# Patient Record
Sex: FEMALE | Race: WHITE
[De-identification: names, ages, dates, MRNs, and addresses within clinical notes are randomized per-mention and may not be internally consistent; named-entity substitution may affect disease eponyms.]

---

## 2017-01-20 ENCOUNTER — HOSPITAL ENCOUNTER (EMERGENCY)
Dept: HOSPITAL 62 - ER | Age: 27
Discharge: HOME | End: 2017-01-20
Payer: MEDICAID

## 2017-01-20 VITALS — SYSTOLIC BLOOD PRESSURE: 103 MMHG | DIASTOLIC BLOOD PRESSURE: 68 MMHG

## 2017-01-20 DIAGNOSIS — G43.119: Primary | ICD-10-CM

## 2017-01-20 DIAGNOSIS — Z87.892: ICD-10-CM

## 2017-01-20 DIAGNOSIS — Z88.6: ICD-10-CM

## 2017-01-20 DIAGNOSIS — F17.200: ICD-10-CM

## 2017-01-20 PROCEDURE — 96372 THER/PROPH/DIAG INJ SC/IM: CPT

## 2017-01-20 PROCEDURE — 99283 EMERGENCY DEPT VISIT LOW MDM: CPT

## 2017-01-20 PROCEDURE — 96374 THER/PROPH/DIAG INJ IV PUSH: CPT

## 2017-01-20 PROCEDURE — 96361 HYDRATE IV INFUSION ADD-ON: CPT

## 2017-01-20 NOTE — ER DOCUMENT REPORT
ED Headache





- General


Chief Complaint: Headache <24 hrs old


Stated Complaint: VOMITING


Mode of Arrival: Ambulatory


Notes: 


Patient is a 18-year-old female comes emergency Department complaining of a 

migraine that started this morning.  Patient states that she has had light 

sensitivity, sound sensitivity and tried to sleep it off without any 

improvement.  She's been vomited twice today without any evidence of blood or 

hematochezia.  He states she is allergic to Imitrex





PCP is [facility]


Has a neurologist, where the name





Past medical history significant for migraine headaches, ectopic pregnancy


Past surgical history significant for cholecystectomy, tonsils and adenoids, 

salpingoectomy R


Social history previous opiate addict, social marijuana use and 5 pack years


TRAVEL OUTSIDE OF THE [place] IN LAST 30 DAYS: No





- Related Data


Allergies/Adverse Reactions: 


 





sumatriptan [Sumatriptan] Allergy (Severe, Verified 01/20/17 19:22)


 Anaphylaxis











Past Medical History





- General


Information source: Patient





- Social History


Smoking Status: Current Every Day Smoker


Chew tobacco use (# tins/day): No


Frequency of alcohol use: None


Drug Abuse: None


Family History: Reviewed & Not Pertinent


Patient has suicidal ideation: No


Patient has homicidal ideation: No


Neurological Medical History: Reports: Hx Migraine


Renal/ Medical History: Denies: Hx Peritoneal Dialysis


Past Surgical History: Reports: Hx Tonsillectomy





- Immunizations


Immunizations up to date: Yes


Hx Diphtheria, Pertussis, Tetanus Vaccination: Yes





Review of Systems





- Review of Systems


Constitutional: No symptoms reported


EENT: No symptoms reported


Cardiovascular: No symptoms reported


Respiratory: No symptoms reported


Gastrointestinal: No symptoms reported


Genitourinary: No symptoms reported


Female Genitourinary: No symptoms reported


Musculoskeletal: No symptoms reported


Skin: No symptoms reported


Hematologic/Lymphatic: No symptoms reported


Neurological/Psychological: See HPI


-: Yes All other systems reviewed and negative





Physical Exam





- Vital signs


Vitals: 


 











Temp Pulse Resp BP Pulse Ox


 


 97.7 F   51 L  16   128/74 H  100 


 


 01/20/17 19:17  01/20/17 19:17  01/20/17 19:17  01/20/17 19:17  01/20/17 19:17














- General


General appearance: Appears well, Alert


In distress: Mild





- HEENT


Head: Normocephalic


Eyes: Normal


Conjunctiva: Normal


Pupils: PERRL





- Neurological


Neuro grossly intact: Yes


Cognition: Normal


Orientation: AAOx4


[place] Coma Scale Eye Opening: Spontaneous


[place] Coma Scale Verbal: Oriented


[place] Coma Scale Motor: Obeys Commands


[place] Coma Scale Total: 15


Speech: Normal


Cranial nerves: Normal


Cerebellar coordination: Normal


Motor strength normal: LUE, RUE, LLE, RLE





- Psychological


Associated symptoms: Normal affect, Normal mood





- Skin


Skin Temperature: Warm


Skin Moisture: Dry





Course





- Re-evaluation


Re-evalutation: 





01/20/17 23:19


Patient is a 18-year-old female who has a migraine headache.  She was medicated 

with Compazine, Reglan and given a liter of IV fluids.  Upon reassessment 

states she feels much improved and she is ready to go home.  Patient encouraged 

to follow up with her neurologist to discuss outpatient management of her 

migraine headaches.





- Vital Signs


Vital signs: 


 











Temp Pulse Resp BP Pulse Ox


 


 97.7 F   51 L  16   128/74 H  100 


 


 01/20/17 19:17  01/20/17 19:17  01/20/17 19:17  01/20/17 19:17  01/20/17 19:17














Discharge





- Discharge


Clinical Impression: 


Migraine


Qualifiers:


 Migraine type: with aura Status migrainosus presence: without status 

migrainosus Intractability: intractable Qualified Code(s): G43.119 - Migraine 

with aura, intractable, without status migrainosus





Condition: Good


Disposition: HOME, SELF-CARE


Additional Instructions: 


HEADACHE:





     The physician does not feel that the headache you are experiencing has a 

serious underlying cause.  Most headaches are due to emotional stress, with 

resultant muscle tension (tension headache). Occasionally, headaches are 

secondary to changes in the blood vessels of the scalp (vascular headache and 

migraine headache).  Sometimes, a headache is the first symptom of another 

developing illness, such as a viral infection.  You have no evidence of stroke, 

bleeding, meningitis, or other serious cause of your headache.


     The treatment of headaches varies with the severity and cause of the pain.

  Not all headaches need pain shots.  In fact, there is evidence that using 

narcotics for headaches may make them worse in the long run.  The physician 

will determine the therapy that's in your best interest.


     If you develop a fever, if the headache is different from any you've 

previously experienced, or if the headache progressively worsens, then call 

your physician at once or go to the emergency room.








REGLAN (METOCLOPRAMIDE):


     Reglan has been prescribed.  This medicine affects the stomach and 

intestines.  It can be used to treat nausea and vomiting, to prevent reflux of 

stomach acid up into the esophagus, or to increase the contractions of the 

stomach and intestines.  It is often prescribed for esophagitis, and for 

paralysis of the stomach in diabetics.


     Reglan can cause either mild restlessness or drowsiness.  You should 

contact the doctor at once if you become extremely restless, anxious, or cannot 

sleep, or if you develop uncontrollable motions of the lips, tongue, or jaw.


     Do not take alcohol with this medicine.  Do not drive or operate machinery 

until you have been taking this medicine long enough to know how it affects you.


     Call the doctor if you develop abdominal pains, lightheadedness, black 

stool, or blood in the stool or vomitus.








USE OF DIPHENHYDRAMINE:


     Diphenhydramine (Benadryl) is an antihistamine and has been recommended to 

help treat your headache and to prevent side effects of other medications used 

to treat headaches.  The medication can be repeated four times daily.


     Age         Elixir (12.5 mg/tsp)         25 mg pill


     adult                                     1-2 tabs


     Antihistamines may cause drowsiness, especially with the first dose.  Do 

not operate machinery or drive while under the effects of the medication.  Do 

not combine the medication with alcohol, or with any other medication without 

talking to your doctor.








INTRAVENOUS COMPAZINE FOR HEADACHE:


     You have received therapy for headaches, using intravenous Compazine.  

This treatment is dramatically successful in relieving the headache in about 50 

percent of cases.  When it works, it provides a rapid method of eliminating the 

headache without resorting to narcotics (and the problems associated with them).


     Most patients still feel fully alert after the Compazine, but others may 

be slightly drowsy.  It's best not to drive or work with machinery for six to 

eight hours.  Do not take alcohol or other medication unless you discuss it 

with the doctor.


     If you develop tightness and spasms in your muscles, especially the neck 

and tongue, you should return.  This is a side effect which can be treated.











FOLLOW-UP CARE:


If you have been referred to a physician for follow-up care, call the physician

s office for an appointment as you were instructed or within the next two days.

  If you experience worsening or a significant change in your symptoms, notify 

the physician immediately or return to the Emergency Department at any time for 

re-evaluation.





-Please be sure to follow-up with your neurologist this coming week.


Referrals: 


BRINDA MEZA DO [Primary Care Provider] - Follow up as needed

## 2017-02-22 ENCOUNTER — HOSPITAL ENCOUNTER (EMERGENCY)
Dept: HOSPITAL 62 - ER | Age: 27
Discharge: HOME | End: 2017-02-22
Payer: MEDICAID

## 2017-02-22 VITALS — DIASTOLIC BLOOD PRESSURE: 80 MMHG | SYSTOLIC BLOOD PRESSURE: 100 MMHG

## 2017-02-22 DIAGNOSIS — S60.212A: ICD-10-CM

## 2017-02-22 DIAGNOSIS — M25.522: ICD-10-CM
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
## 2017-02-22 DIAGNOSIS — F17.210: ICD-10-CM

## 2017-02-22 DIAGNOSIS — M25.532: ICD-10-CM

## 2017-02-22 DIAGNOSIS — S59.802A: Primary | ICD-10-CM

## 2017-02-22 PROCEDURE — 99283 EMERGENCY DEPT VISIT LOW MDM: CPT
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
## 2017-03-04 ENCOUNTER — HOSPITAL ENCOUNTER (EMERGENCY)
Dept: HOSPITAL 62 - ER | Age: 27
Discharge: HOME | End: 2017-03-04
Payer: MEDICAID

## 2017-03-04 DIAGNOSIS — R09.81: ICD-10-CM

## 2017-03-04 DIAGNOSIS — Z88.6: ICD-10-CM

## 2017-03-04 DIAGNOSIS — J34.89: ICD-10-CM

## 2017-03-04 DIAGNOSIS — J01.00: ICD-10-CM

## 2017-03-04 DIAGNOSIS — R50.9: ICD-10-CM

## 2017-03-04 DIAGNOSIS — J40: Primary | ICD-10-CM

## 2017-03-04 DIAGNOSIS — F17.210: ICD-10-CM

## 2017-03-04 DIAGNOSIS — R07.89: ICD-10-CM

## 2017-03-04 DIAGNOSIS — Z87.892: ICD-10-CM

## 2017-03-04 DIAGNOSIS — R05: ICD-10-CM

## 2017-03-04 PROCEDURE — 99283 EMERGENCY DEPT VISIT LOW MDM: CPT

## 2017-03-04 NOTE — ER DOCUMENT REPORT
ED Respiratory Problem





- General


Chief Complaint: Cough


Stated Complaint: FEVER/VOMITING


Time seen by provider: 23:42


Mode of Arrival: Ambulatory


Information source: Patient


TRAVEL OUTSIDE OF THE [place] IN LAST 30 DAYS: No





- HPI


Patient complains to provider of: Cough


Onset: Other - 3-months


Duration: Worse/persistent


Quality of pain: No pain


Context: Smoker


Short of Breath: Mild


Chest pain/discomfort: Tightness


Cough: Productive


Sputum amount: Small


Sputum color: Creamy


Sputum consistency: Mucoid


Associated symptoms: Congestion, Cough, Fever, Sinus pain/pressure


Similar symptoms previously: Yes


Recently seen / treated by doctor: No


Notes: 


Patient is a 26-year-old female who presents to the emergency room complaining 

of productive cough, nasal congestion, sinus pain and pressure, fever, symptoms 

have been going on for the past 3 months, she states she saw primary care 

provider a few weeks ago and was placed on antibiotics, this did not help her 

symptoms at all, patient is a smoker, in fact on my initial evaluation is 

unable to locate her in the room initially, when she returned she admitted that 

she had just been outside smoking a cigarette





- Related Data


Allergies/Adverse Reactions: 


 





sumatriptan [Sumatriptan] Allergy (Severe, Verified 03/04/17 22:09)


 Anaphylaxis











Past Medical History





- General


Information source: Patient





- Social History


Smoking Status: Never Smoker


Family History: Reviewed & Not Pertinent


Patient has suicidal ideation: No


Patient has homicidal ideation: No


Neurological Medical History: Reports: Hx Migraine


Renal/ Medical History: Denies: Hx Peritoneal Dialysis


Past Surgical History: Reports: Hx Tonsillectomy





- Immunizations


Immunizations up to date: Yes


Hx Diphtheria, Pertussis, Tetanus Vaccination: Yes





Review of Systems





- Review of Systems


Constitutional: Fever


EENT: See HPI


Cardiovascular: No symptoms reported


Respiratory: Cough


Gastrointestinal: No symptoms reported


Genitourinary: No symptoms reported


Female Genitourinary: No symptoms reported


Musculoskeletal: No symptoms reported


Skin: No symptoms reported


Hematologic/Lymphatic: No symptoms reported


Neurological/Psychological: No symptoms reported


-: Yes All other systems reviewed and negative





Physical Exam





- Vital signs


Vitals: 


 











Temp Pulse Resp BP Pulse Ox


 


 97.7 F   56 L  16   106/66   98 


 


 03/04/17 21:40  03/04/17 21:40  03/04/17 21:40  03/04/17 21:40  03/04/17 21:40











Interpretation: Normal





- General


General appearance: Appears well, Alert





- HEENT


Head: Normocephalic, Atraumatic


Eyes: Normal


Conjunctiva: Normal


Extraocular movements intact: Yes


Eyelashes: Normal


Pupils: PERRL


Ears: Normal


External canal: Normal


Tympanic membrane: Normal


Sinus: Maxillary


Nasal: Normal


Mouth/Lips: Normal


Mucous membranes: Normal


Pharynx: Normal





- Respiratory


Respiratory status: No respiratory distress


Chest status: Nontender


Breath sounds: Normal


Chest palpation: Normal





- Cardiovascular


Rhythm: Regular


Heart sounds: Normal auscultation


Murmur: No





- Abdominal


Inspection: Normal





- Back


Back: Normal, Nontender





- Extremities


General upper extremity: Normal inspection, Nontender, Normal color, Normal ROM

, Normal temperature


General lower extremity: Normal inspection, Nontender, Normal color, Normal ROM

, Normal temperature, Normal weight bearing.  No: Andi's sign





- Neurological


Neuro grossly intact: Yes


Cognition: Normal


Orientation: AAOx4


[place] Coma Scale Eye Opening: Spontaneous


[place] Coma Scale Verbal: Oriented


[place] Coma Scale Motor: Obeys Commands


[place] Coma Scale Total: 15


Speech: Normal


Motor strength normal: LUE, RUE, LLE, RLE


Sensory: Normal





- Psychological


Associated symptoms: Normal affect, Normal mood





- Skin


Skin Temperature: Warm


Skin Moisture: Dry


Skin Color: Normal





Course





- Re-evaluation


Re-evalutation: 





03/05/17 03:26


Patient is a smoker, with a persistent cough, fever, sinus pain and pressure, 

consistent with sinusitis, she was started on antibiotics, advised to stop 

smoking, follow-up with a primary care provider or return if symptoms worsen, 

patient acknowledges understanding and agreement with this plan





- Vital Signs


Vital signs: 


 











Temp Pulse Resp BP Pulse Ox


 


 97.9 F   64   18   112/69   99 


 


 03/04/17 23:49  03/04/17 23:49  03/04/17 23:49  03/04/17 23:49  03/04/17 23:49














Discharge





- Discharge


Clinical Impression: 


 Bronchitis





Sinusitis


Qualifiers:


 Sinusitis location: maxillary Chronicity: subacute Qualified Code(s): J01.00 - 

Acute maxillary sinusitis, unspecified





Condition: Stable


Disposition: HOME, SELF-CARE


Instructions:  Bronchitis ([facility]), Sinusitis ([facility])


Additional Instructions: 


Follow up with your primary care provider in one to 2 days.  Return to the 

emergency room immediately if symptoms worsen or any additional concerns.


Prescriptions: 


Levofloxacin [Levaquin 500 mg Tablet] 500 mg PO DAILY #7 tablet


Forms:  Smoking Cessation Education, Return to Work


Referrals: 


JARRED MENDEZ MD [Primary Care Provider] - Follow up as needed

## 2017-03-04 NOTE — ER DOCUMENT REPORT
ED Medical Screen (RME)





- General


Stated Complaint: FEVER/VOMITING


Time seen by provider: 22:10


Mode of Arrival: Ambulatory


Information source: Patient


Notes: 


26-year-old female that smokes is complaining of a cough and severe nasal 

congestion.  The cough has been there for 2 weeks but she has nasal congestion 

for 3 months.  No nasal inhalation of any drugs.  Sinus pressure when she blows 

her nose. 





I have greeted and performed a rapid initial assessment of this patient.  A 

comprehensive ED assessment, evaluation of the patient, analysis of test results

, and completion of the medical decision making process will be conducted by 

additional ED providers.


TRAVEL OUTSIDE OF THE [place] IN LAST 30 DAYS: No





- Related Data


Allergies/Adverse Reactions: 


 





sumatriptan [Sumatriptan] Allergy (Severe, Verified 03/04/17 22:09)


 Anaphylaxis











Past Medical History


Neurological Medical History: Reports: Hx Migraine


Renal/ Medical History: Denies: Hx Peritoneal Dialysis


Past Surgical History: Reports: Hx Tonsillectomy





- Immunizations


Immunizations up to date: Yes


Hx Diphtheria, Pertussis, Tetanus Vaccination: Yes





Physical Exam





- Vital signs


Vitals: 





 











Temp Pulse Resp BP Pulse Ox


 


 97.7 F   56 L  16   106/66   98 


 


 03/04/17 21:40  03/04/17 21:40  03/04/17 21:40  03/04/17 21:40  03/04/17 21:40














Course





- Vital Signs


Vital signs: 





 











Temp Pulse Resp BP Pulse Ox


 


 97.7 F   56 L  16   106/66   98 


 


 03/04/17 21:40  03/04/17 21:40  03/04/17 21:40  03/04/17 21:40  03/04/17 21:40

## 2017-03-05 VITALS — SYSTOLIC BLOOD PRESSURE: 112 MMHG | DIASTOLIC BLOOD PRESSURE: 69 MMHG

## 2017-07-22 NOTE — ER DOCUMENT REPORT
ED Medical Screen (RME)





- General


Chief Complaint: Headache


Stated Complaint: VOMITING


Time seen by provider: 19:19


Mode of Arrival: Ambulatory


Notes: 


26-year-old female presents to ED for headache since this morning with nausea 

and vomiting 6.  Headache increases with light noise movement and position.  

This is similar to her normal migraines.  She states when they get bad like 

this she needs to take Toradol Benadryl Compazine and IV fluids in the 

emergency room.  Last menstrual period 01/18/2017





I have greeted and performed a rapid initial assessment of this patient.  A 

comprehensive ED assessment and evaluation of the patient, analysis of test 

results and completion of medical decision making process will be conducted by 

an additional ED providers.


TRAVEL OUTSIDE OF THE [place] IN LAST 30 DAYS: No





- Related Data


Allergies/Adverse Reactions: 


 





sumatriptan [Sumatriptan] Allergy (Severe, Verified 01/17/16 16:21)


 Anaphylaxis











Past Medical History


Neurological Medical History: Reports: Hx Migraine


Past Surgical History: Reports: Hx Tonsillectomy





- Immunizations


Immunizations up to date: Yes


Hx Diphtheria, Pertussis, Tetanus Vaccination: Yes





Physical Exam





- Vital signs


Vitals: 





 











Temp Pulse Resp BP Pulse Ox


 


 97.7 F   51 L  16   128/74 H  100 


 


 01/20/17 19:17  01/20/17 19:17  01/20/17 19:17  01/20/17 19:17  01/20/17 19:17














Course





- Vital Signs


Vital signs: 





 











Temp Pulse Resp BP Pulse Ox


 


 97.7 F   51 L  16   128/74 H  100 


 


 01/20/17 19:17  01/20/17 19:17  01/20/17 19:17  01/20/17 19:17  01/20/17 19:17 I have personally seen and examined this patient.  I have fully participated in the care of this patient. I have reviewed all pertinent clinical information, including history, physical exam, plan and the Resident’s note and agree except as noted.

## 2018-02-01 ENCOUNTER — HOSPITAL ENCOUNTER (EMERGENCY)
Dept: HOSPITAL 62 - ER | Age: 28
Discharge: HOME | End: 2018-02-01
Payer: MEDICAID

## 2018-02-01 VITALS — DIASTOLIC BLOOD PRESSURE: 79 MMHG | SYSTOLIC BLOOD PRESSURE: 125 MMHG

## 2018-02-01 DIAGNOSIS — L03.116: Primary | ICD-10-CM

## 2018-02-01 DIAGNOSIS — M79.672: ICD-10-CM

## 2018-02-01 DIAGNOSIS — Z86.14: ICD-10-CM

## 2018-02-01 DIAGNOSIS — M79.89: ICD-10-CM

## 2018-02-01 PROCEDURE — 99282 EMERGENCY DEPT VISIT SF MDM: CPT

## 2018-02-01 PROCEDURE — 96372 THER/PROPH/DIAG INJ SC/IM: CPT

## 2018-02-01 NOTE — ER DOCUMENT REPORT
ED Skin Rash/Insect Bite/Abscs





- General


Chief Complaint: Abscess


Stated Complaint: FOOT SWELLING


Time Seen by Provider: 02/01/18 02:42


Mode of Arrival: Ambulatory


Information source: Patient


Notes: 





Patient is a 27-year-old female who presents to the ER today for left foot 

redness and swelling, pain with some drainage from the top of the left foot 2 

days.  Patient denies any fevers or chills, history of MRSA.  She does not know 

what happened to the foot.


TRAVEL OUTSIDE OF THE [place] IN LAST 30 DAYS: No





- Related Data


Allergies/Adverse Reactions: 


 





sumatriptan [Sumatriptan] Allergy (Severe, Verified 03/04/17 22:09)


 Anaphylaxis











Past Medical History





- General


Information source: Patient





- Social History


Smoking Status: Unknown if Ever Smoked


Family History: Reviewed & Not Pertinent


Patient has suicidal ideation: No


Patient has homicidal ideation: No


Neurological Medical History: Reports: Hx Migraine


Renal/ Medical History: Denies: Hx Peritoneal Dialysis


Past Surgical History: Reports: Hx Tonsillectomy





- Immunizations


Immunizations up to date: Yes


Hx Diphtheria, Pertussis, Tetanus Vaccination: Yes





Review of Systems





- Review of Systems


Constitutional: No symptoms reported


EENT: No symptoms reported


Cardiovascular: No symptoms reported


Respiratory: No symptoms reported


Gastrointestinal: No symptoms reported


Genitourinary: No symptoms reported


Female Genitourinary: No symptoms reported


Musculoskeletal: No symptoms reported


Skin: See HPI


Hematologic/Lymphatic: No symptoms reported


Neurological/Psychological: No symptoms reported





Physical Exam





- Vital signs


Vitals: 


 











Temp Pulse Resp BP Pulse Ox


 


 98.5 F   99   18   125/79   100 


 


 02/01/18 01:24  02/01/18 01:24  02/01/18 01:24  02/01/18 01:24  02/01/18 01:24














- Notes


Notes: 





PHYSICAL EXAMINATION: 


GENERAL: in no acute distress. 


HEAD: Atraumatic, normocephalic. 


EYES: Pupils equal round and reactive to light, extraocular movements intact, 

sclera anicteric, conjunctiva are normal. 


NECK: Normal range of motion, supple without lymphadenopathy 


LUNGS: CTAB and equal. No wheezes rales or rhonchi. 


HEART: Regular rate and rhythm without murmurs


EXTREMITIES: Normal range of motion, no pitting edema. No cyanosis. 


NEUROLOGICAL: Cranial nerves grossly intact. Normal sensory/motor exams. 


PSYCH: Normal mood, normal affect. 


SKIN: Warm, Dry, normal turgor, drainage, yellow coming from an ulceration to 

the dorsal left foot with erythema surrounding, edema to the dorsal left foot, 

tender to palpation, patient with sores on face and bilateral arms

















Course





- Re-evaluation


Re-evalutation: 





02/01/18 04:05


We will start patient on Rocephin here and give her an IM dose of Decadron, as 

well as sending her home on Bactrim and prednisone for left foot cellulitis.  

Patient with strict return precautions for worsening symptoms.





- Vital Signs


Vital signs: 


 











Temp Pulse Resp BP Pulse Ox


 


 98.5 F   99   18   125/79   100 


 


 02/01/18 01:24  02/01/18 01:24  02/01/18 01:24  02/01/18 01:24  02/01/18 01:24














Discharge





- Discharge


Clinical Impression: 


 Cellulitis of left foot





Condition: Stable


Disposition: HOME, SELF-CARE


Additional Instructions: 


Return immediately for any new or worsening symptoms.





Follow up with primary care provider, call tomorrow to make followup 

appointment.


Prescriptions: 


Prednisone 40 mg PO DAILY #10 tablet


Sulfamethoxazole/Trimethoprim [Bactrim Ds Tablet] 1 each PO BID #20 tablet


Referrals: 


MELLO CALABRESE MD [Primary Care Provider] - Follow up as needed

## 2019-06-11 ENCOUNTER — HOSPITAL ENCOUNTER (EMERGENCY)
Dept: HOSPITAL 62 - ER | Age: 29
Discharge: HOME | End: 2019-06-11
Payer: SELF-PAY

## 2019-06-11 VITALS — SYSTOLIC BLOOD PRESSURE: 130 MMHG | DIASTOLIC BLOOD PRESSURE: 91 MMHG

## 2019-06-11 DIAGNOSIS — S09.93XA: Primary | ICD-10-CM
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
## 2019-06-11 DIAGNOSIS — Z23: ICD-10-CM

## 2019-06-11 PROCEDURE — 99283 EMERGENCY DEPT VISIT LOW MDM: CPT

## 2019-06-11 PROCEDURE — 90471 IMMUNIZATION ADMIN: CPT

## 2019-06-11 PROCEDURE — 70450 CT HEAD/BRAIN W/O DYE: CPT

## 2019-06-11 PROCEDURE — 90715 TDAP VACCINE 7 YRS/> IM: CPT

## 2019-06-11 PROCEDURE — 70486 CT MAXILLOFACIAL W/O DYE: CPT

## 2019-06-11 PROCEDURE — 96372 THER/PROPH/DIAG INJ SC/IM: CPT
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
## 2019-08-11 ENCOUNTER — HOSPITAL ENCOUNTER (EMERGENCY)
Dept: HOSPITAL 62 - ER | Age: 29
Discharge: HOME | End: 2019-08-11
Payer: SELF-PAY

## 2019-08-11 VITALS — DIASTOLIC BLOOD PRESSURE: 89 MMHG | SYSTOLIC BLOOD PRESSURE: 135 MMHG

## 2019-08-11 DIAGNOSIS — M79.18: ICD-10-CM

## 2019-08-11 DIAGNOSIS — D72.829: ICD-10-CM

## 2019-08-11 DIAGNOSIS — Z87.892: ICD-10-CM

## 2019-08-11 DIAGNOSIS — F17.200: ICD-10-CM

## 2019-08-11 DIAGNOSIS — Z88.6: ICD-10-CM

## 2019-08-11 DIAGNOSIS — R10.9: Primary | ICD-10-CM

## 2019-08-11 LAB
ADD MANUAL DIFF: NO
ALBUMIN SERPL-MCNC: 4.4 G/DL (ref 3.5–5)
ALP SERPL-CCNC: 63 U/L (ref 38–126)
ANION GAP SERPL CALC-SCNC: 9 MMOL/L (ref 5–19)
APPEARANCE UR: (no result)
APTT PPP: YELLOW S
AST SERPL-CCNC: 27 U/L (ref 14–36)
BASOPHILS # BLD AUTO: 0.1 10^3/UL (ref 0–0.2)
BASOPHILS NFR BLD AUTO: 0.4 % (ref 0–2)
BILIRUB DIRECT SERPL-MCNC: 0.2 MG/DL (ref 0–0.4)
BILIRUB SERPL-MCNC: 0.3 MG/DL (ref 0.2–1.3)
BILIRUB UR QL STRIP: NEGATIVE
BUN SERPL-MCNC: 13 MG/DL (ref 7–20)
CALCIUM: 9.6 MG/DL (ref 8.4–10.2)
CHLORIDE SERPL-SCNC: 101 MMOL/L (ref 98–107)
CO2 SERPL-SCNC: 27 MMOL/L (ref 22–30)
EOSINOPHIL # BLD AUTO: 0.1 10^3/UL (ref 0–0.6)
EOSINOPHIL NFR BLD AUTO: 0.6 % (ref 0–6)
ERYTHROCYTE [DISTWIDTH] IN BLOOD BY AUTOMATED COUNT: 13 % (ref 11.5–14)
GLUCOSE SERPL-MCNC: 92 MG/DL (ref 75–110)
GLUCOSE UR STRIP-MCNC: NEGATIVE MG/DL
HCT VFR BLD CALC: 42.7 % (ref 36–47)
HGB BLD-MCNC: 14.7 G/DL (ref 12–15.5)
KETONES UR STRIP-MCNC: (no result) MG/DL
LYMPHOCYTES # BLD AUTO: 3.4 10^3/UL (ref 0.5–4.7)
LYMPHOCYTES NFR BLD AUTO: 27.9 % (ref 13–45)
MCH RBC QN AUTO: 31.4 PG (ref 27–33.4)
MCHC RBC AUTO-ENTMCNC: 34.3 G/DL (ref 32–36)
MCV RBC AUTO: 92 FL (ref 80–97)
MONOCYTES # BLD AUTO: 0.7 10^3/UL (ref 0.1–1.4)
MONOCYTES NFR BLD AUTO: 5.6 % (ref 3–13)
NEUTROPHILS # BLD AUTO: 7.9 10^3/UL (ref 1.7–8.2)
NEUTS SEG NFR BLD AUTO: 65.5 % (ref 42–78)
NITRITE UR QL STRIP: NEGATIVE
PH UR STRIP: 5 [PH] (ref 5–9)
PLATELET # BLD: 266 10^3/UL (ref 150–450)
POTASSIUM SERPL-SCNC: 4.1 MMOL/L (ref 3.6–5)
PROT SERPL-MCNC: 7.3 G/DL (ref 6.3–8.2)
PROT UR STRIP-MCNC: NEGATIVE MG/DL
RBC # BLD AUTO: 4.67 10^6/UL (ref 3.72–5.28)
SP GR UR STRIP: 1.02
TOTAL CELLS COUNTED % (AUTO): 100 %
UROBILINOGEN UR-MCNC: NEGATIVE MG/DL (ref ?–2)
WBC # BLD AUTO: 12.1 10^3/UL (ref 4–10.5)

## 2019-08-11 PROCEDURE — 81025 URINE PREGNANCY TEST: CPT

## 2019-08-11 PROCEDURE — 96374 THER/PROPH/DIAG INJ IV PUSH: CPT

## 2019-08-11 PROCEDURE — 85025 COMPLETE CBC W/AUTO DIFF WBC: CPT

## 2019-08-11 PROCEDURE — 99284 EMERGENCY DEPT VISIT MOD MDM: CPT

## 2019-08-11 PROCEDURE — 80053 COMPREHEN METABOLIC PANEL: CPT

## 2019-08-11 PROCEDURE — 81001 URINALYSIS AUTO W/SCOPE: CPT

## 2019-08-11 PROCEDURE — 83690 ASSAY OF LIPASE: CPT

## 2019-08-11 PROCEDURE — 36415 COLL VENOUS BLD VENIPUNCTURE: CPT

## 2019-08-11 PROCEDURE — 96375 TX/PRO/DX INJ NEW DRUG ADDON: CPT

## 2019-08-11 NOTE — ER DOCUMENT REPORT
ED Medical Screen (RME)





- General


Chief Complaint: Flank Pain


Stated Complaint: FLANK PAIN


Time Seen by Provider: 08/11/19 17:40


Primary Care Provider: 


MELLO CALABRESE MD [Primary Care Provider] - Follow up as needed


Notes: 





28-year-old female with history of migraine headaches presents to the emergency 

department with chief complaint of bilateral flank pain left worse than right 

and fevers x3 days.  Patient states that the symptoms started as flank pain and 

developed into a fever with a T-max of 101 and she has had 5 episodes of 

vomiting in the last 3 days.  Patient states she is nauseated every time she at

tempts to eat but she is able to tolerate fluids.  She denies any urinary 

frequency/urgency/dysuria, denies abnormal vaginal discharge.





EXAM: Well-nourished, well-appearing in no acute distress.  Lungs clear to 

auscultation in all fields, regular cardiac rate and rhythm, left CVAT and mild 

right CVAT





I have greeted and performed a rapid initial assessment of this patient.  A 

comprehensive ED assessment and evaluation of the patient, analysis of test 

results and completion of medical decision making process will be conducted by 

an additional ED providers.


TRAVEL OUTSIDE OF THE [place] IN LAST 30 DAYS: No





- Related Data


Allergies/Adverse Reactions: 


                                        





sumatriptan [Sumatriptan] Allergy (Severe, Verified 06/11/19 09:27)


   Anaphylaxis











Past Medical History





- Social History


Chew tobacco use (# tins/day): No


Drug Abuse: None


Neurological Medical History: Reports: Hx Migraine


Renal/ Medical History: Denies: Hx Peritoneal Dialysis


Past Surgical History: Reports: Hx Tonsillectomy





- Immunizations


Immunizations up to date: Yes


Hx Diphtheria, Pertussis, Tetanus Vaccination: Yes





Physical Exam





- Vital signs


Vitals: 





                                        











Temp Pulse Resp BP Pulse Ox


 


 98.5 F   85   18   135/89 H  100 


 


 08/11/19 17:07  08/11/19 17:07  08/11/19 17:07  08/11/19 17:07  08/11/19 17:07














Course





- Vital Signs


Vital signs: 





                                        











Temp Pulse Resp BP Pulse Ox


 


 98.5 F   85   18   135/89 H  100 


 


 08/11/19 17:07  08/11/19 17:07  08/11/19 17:07  08/11/19 17:07  08/11/19 17:07














Doctor's Discharge





- Discharge


Referrals: 


MELLO CALABRESE MD [Primary Care Provider] - Follow up as needed

## 2019-08-11 NOTE — ER DOCUMENT REPORT
ED GI/





- General


Chief Complaint: Flank Pain


Stated Complaint: FLANK PAIN


Time Seen by Provider: 08/11/19 17:40


Primary Care Provider: 


MELLO CALABRESE MD [Primary Care Provider] - Follow up as needed


Notes: 





28-year-old female to the emergency department chief complaint of flank pain.  

States that she has not felt well.  Is concerned that she may have another 

urinary tract infection.  Complaining of pain in the bilateral flank area but 

also in the muscle area on the bilateral lumbar spine.


TRAVEL OUTSIDE OF THE [place] IN LAST 30 DAYS: No





- HPI


Patient complains to provider of: Flank pain


Onset: This morning


Timing/Duration: Gradual


Quality of pain: Achy


Severity at maximum: Moderate


Severity in ED: Moderate


Location: Left flank, Right flank


Vaginal bleeding (Compared to normal period): None


Associated symptoms: Fever





- Related Data


Allergies/Adverse Reactions: 


                                        





sumatriptan [Sumatriptan] Allergy (Severe, Verified 06/11/19 09:27)


   Anaphylaxis











Past Medical History





- General


Information source: Patient





- Social History


Smoking Status: Current Every Day Smoker


Chew tobacco use (# tins/day): No


Drug Abuse: None


Lives with: Family


Family History: Reviewed & Not Pertinent


Patient has suicidal ideation: No


Patient has homicidal ideation: No


Neurological Medical History: Reports: Hx Migraine


Renal/ Medical History: Denies: Hx Peritoneal Dialysis


Past Surgical History: Reports: Hx Tonsillectomy





- Immunizations


Immunizations up to date: Yes


Hx Diphtheria, Pertussis, Tetanus Vaccination: Yes





Review of Systems





- Review of Systems


Notes: 





Constitutional: denies: Chills, Diaphoresis, Fever, Malaise, Weakness





EENT: denies: Eye discharge, Blurred vision, Tearing, Double vision, Nose 

congestion, Nose discharge, Throat swelling, Mouth pain





Cardiovascular: denies: Palpitations, Heart racing, Orthopnea, Dyspnea, Chest 

pain





Respiratory: denies: Cough, Hurts to breathe, Wheezing, Shortness of breath





Gastrointestinal: denies: Abdominal pain, Diarrhea, Nausea, Vomiting, Black 

stools, bright red blood in stool





Genitourinary: denies: Burning, Dysuria, Discharge, Frequency, +Flank pain, -

Hematuria





Musculoskeletal:  denies: Joint pain, Joint swelling, Muscle pain, Muscle 

stiffness, back pain





Hematologic/Lymphatic:  denies: Anemia, Easy bleeding, Easy bruising, Blood 

clots





Neurological/Psychological: denies: Confusion, Dementia, Depression, Loss of 

consciousness





Skin: No lesions, no masses, no skin breakdown, no abscesses





Physical Exam





- Vital signs


Vitals: 


                                        











Temp Pulse Resp BP Pulse Ox


 


 98.5 F   85   18   135/89 H  100 


 


 08/11/19 17:07  08/11/19 17:07  08/11/19 17:07  08/11/19 17:07  08/11/19 17:07











Interpretation: Normal





- General


General appearance: Appears well, Alert





- HEENT


Head: Normocephalic, Atraumatic


Eyes: Normal


Pupils: PERRL





- Respiratory


Respiratory status: No respiratory distress


Chest status: Nontender


Breath sounds: Normal


Chest palpation: Normal





- Cardiovascular


Rhythm: Regular


Heart sounds: Normal auscultation


Murmur: No





- Abdominal


Inspection: Normal


Distension: No distension


Bowel sounds: Normal


Tenderness: Nontender


Organomegaly: No organomegaly





- Back


Back: Normal, Tender - Tenderness to palpation paraspinal muscles bilaterally 

lumbar spine.  No midline tenderness.  No significant flank tenderness..  No: 

CVA tenderness





- Extremities


General upper extremity: Normal inspection, Nontender, Normal color, Normal ROM,

 Normal temperature


General lower extremity: Normal inspection, Nontender, Normal color, Normal ROM,

 Normal temperature, Normal weight bearing.  No: Andi's sign





- Neurological


Neuro grossly intact: Yes


Cognition: Normal


Orientation: AAOx4


[place] Coma Scale Eye Opening: Spontaneous


[place] Coma Scale Verbal: Oriented


[place] Coma Scale Motor: Obeys Commands


[place] Coma Scale Total: 15


Speech: Normal


Motor strength normal: LUE, RUE, LLE, RLE


Sensory: Normal





- Psychological


Associated symptoms: Normal affect, Normal mood





- Skin


Skin Temperature: Warm


Skin Moisture: Dry


Skin Color: Normal





Course





- Re-evaluation


Re-evalutation: 





08/11/19 21:31





Laboratory











  08/11/19 08/11/19 08/11/19





  18:12 18:36 18:36


 


WBC   12.1 H 


 


RBC   4.67 


 


Hgb   14.7 


 


Hct   42.7 


 


MCV   92 


 


MCH   31.4 


 


MCHC   34.3 


 


RDW   13.0 


 


Plt Count   266 


 


Seg Neutrophils %   65.5 


 


Lymphocytes %   27.9 


 


Monocytes %   5.6 


 


Eosinophils %   0.6 


 


Basophils %   0.4 


 


Absolute Neutrophils   7.9 


 


Absolute Lymphocytes   3.4 


 


Absolute Monocytes   0.7 


 


Absolute Eosinophils   0.1 


 


Absolute Basophils   0.1 


 


Sodium    Cancelled


 


Potassium    Cancelled


 


Chloride    Cancelled


 


Carbon Dioxide    Cancelled


 


Anion Gap    Cancelled


 


BUN    Cancelled


 


Creatinine    Cancelled


 


Est GFR ( Amer)    Cancelled


 


Est GFR (Non-Af Amer)    Cancelled


 


Glucose    Cancelled


 


Calcium    Cancelled


 


Total Bilirubin    Cancelled


 


Direct Bilirubin    Cancelled


 


Neonat Total Bilirubin    Cancelled


 


Neonat Direct Bilirubin    Cancelled


 


Neonat Indirect Bili    Cancelled


 


AST    Cancelled


 


ALT    Cancelled


 


Alkaline Phosphatase    Cancelled


 


Total Protein    Cancelled


 


Albumin    Cancelled


 


Lipase    Cancelled


 


Urine Color  YELLOW  


 


Urine Appearance  SLIGHTLY-CLOUDY  


 


Urine pH  5.0  


 


Ur Specific Gravity  1.018  


 


Urine Protein  NEGATIVE  


 


Urine Glucose (UA)  NEGATIVE  


 


Urine Ketones  TRACE H  


 


Urine Blood  NEGATIVE  


 


Urine Nitrite  NEGATIVE  


 


Urine Bilirubin  NEGATIVE  


 


Urine Urobilinogen  NEGATIVE  


 


Ur Leukocyte Esterase  NEGATIVE  


 


Urine WBC (Auto)  1  


 


Urine RBC (Auto)  1  


 


U Hyaline Cast (Auto)  1  


 


Squamous Epi Cells Auto  6  


 


Urine Mucus (Auto)  OCC  


 


Urine Ascorbic Acid  NEGATIVE  


 


Urine HCG, Qual  NEGATIVE  














  08/11/19





  19:30


 


WBC 


 


RBC 


 


Hgb 


 


Hct 


 


MCV 


 


MCH 


 


MCHC 


 


RDW 


 


Plt Count 


 


Seg Neutrophils % 


 


Lymphocytes % 


 


Monocytes % 


 


Eosinophils % 


 


Basophils % 


 


Absolute Neutrophils 


 


Absolute Lymphocytes 


 


Absolute Monocytes 


 


Absolute Eosinophils 


 


Absolute Basophils 


 


Sodium  136.9 L


 


Potassium  4.1


 


Chloride  101


 


Carbon Dioxide  27


 


Anion Gap  9


 


BUN  13


 


Creatinine  0.71


 


Est GFR ( Amer)  > 60


 


Est GFR (Non-Af Amer)  > 60


 


Glucose  92


 


Calcium  9.6


 


Total Bilirubin  0.3


 


Direct Bilirubin  0.2


 


Neonat Total Bilirubin  Not Reportable


 


Neonat Direct Bilirubin  Not Reportable


 


Neonat Indirect Bili  Not Reportable


 


AST  27


 


ALT  21


 


Alkaline Phosphatase  63


 


Total Protein  7.3


 


Albumin  4.4


 


Lipase  85.7


 


Urine Color 


 


Urine Appearance 


 


Urine pH 


 


Ur Specific Gravity 


 


Urine Protein 


 


Urine Glucose (UA) 


 


Urine Ketones 


 


Urine Blood 


 


Urine Nitrite 


 


Urine Bilirubin 


 


Urine Urobilinogen 


 


Ur Leukocyte Esterase 


 


Urine WBC (Auto) 


 


Urine RBC (Auto) 


 


U Hyaline Cast (Auto) 


 


Squamous Epi Cells Auto 


 


Urine Mucus (Auto) 


 


Urine Ascorbic Acid 


 


Urine HCG, Qual 








This is a very well-appearing 28 female to the emergency department complaining 

of some flank pain.  She does not have any significant amount of blood in the 

urine.  No evidence of infection.  Soft abdomen.  Afebrile.  Slightly elevated 

WBC count but otherwise unremarkable.  At this time I am going to give her some 

pain medication and hydrate her.  I have advised her to return if symptoms are 

getting worse.





- Vital Signs


Vital signs: 


                                        











Temp Pulse Resp BP Pulse Ox


 


 98.5 F   85   18   135/89 H  100 


 


 08/11/19 17:07  08/11/19 17:07  08/11/19 17:07  08/11/19 17:07  08/11/19 17:07














- Laboratory


Result Diagrams: 


                                 08/11/19 18:36





                                 08/11/19 19:30


Laboratory results interpreted by me: 


                                        











  08/11/19 08/11/19 08/11/19





  18:12 18:36 19:30


 


WBC   12.1 H 


 


Sodium    136.9 L


 


Urine Ketones  TRACE H  














Discharge





- Discharge


Clinical Impression: 


 Flank pain





Condition: Good


Disposition: HOME, SELF-CARE


Instructions:  Toradol Injection ([facility]), Pain Medication Injection ([facility]), Oral 

Narcotic Medication ([facility]), Flank Pain ([facility])


Additional Instructions: 


Your labs today were fairly unremarkable.  In the event that symptoms are 

getting worse please return.  Follow-up with your regular doctor.


Prescriptions: 


Ibuprofen [Motrin 800 mg Tablet] 800 mg PO Q8H PRN 10 Days #30 tab


 PRN Reason: For Pain Scale 3-4


Forms:  Return to Work


Referrals: 


MELLO CALABRESE MD [Primary Care Provider] - Follow up as needed